# Patient Record
Sex: FEMALE | Race: WHITE | NOT HISPANIC OR LATINO | Employment: UNEMPLOYED | URBAN - METROPOLITAN AREA
[De-identification: names, ages, dates, MRNs, and addresses within clinical notes are randomized per-mention and may not be internally consistent; named-entity substitution may affect disease eponyms.]

---

## 2023-03-24 ENCOUNTER — HOSPITAL ENCOUNTER (EMERGENCY)
Facility: HOSPITAL | Age: 33
Discharge: HOME | End: 2023-03-25
Attending: INTERNAL MEDICINE

## 2023-03-24 VITALS
SYSTOLIC BLOOD PRESSURE: 139 MMHG | OXYGEN SATURATION: 98 % | WEIGHT: 150 LBS | BODY MASS INDEX: 23.54 KG/M2 | HEIGHT: 67 IN | HEART RATE: 87 BPM | RESPIRATION RATE: 18 BRPM | TEMPERATURE: 97.8 F | DIASTOLIC BLOOD PRESSURE: 87 MMHG

## 2023-03-24 DIAGNOSIS — K04.7 DENTAL ABSCESS: ICD-10-CM

## 2023-03-24 DIAGNOSIS — R68.84 JAW PAIN: Primary | ICD-10-CM

## 2023-03-24 RX ORDER — AMOXICILLIN 500 MG/1
500 CAPSULE ORAL ONCE
Status: COMPLETED | OUTPATIENT
Start: 2023-03-25 | End: 2023-03-25

## 2023-03-24 RX ORDER — IBUPROFEN 400 MG/1
800 TABLET ORAL ONCE
Status: DISCONTINUED | OUTPATIENT
Start: 2023-03-25 | End: 2023-03-25

## 2023-03-25 RX ORDER — KETOROLAC TROMETHAMINE 30 MG/ML
15 INJECTION, SOLUTION INTRAMUSCULAR; INTRAVENOUS ONCE
Status: COMPLETED | OUTPATIENT
Start: 2023-03-25 | End: 2023-03-25

## 2023-03-25 RX ORDER — AMOXICILLIN 500 MG/1
500 CAPSULE ORAL 3 TIMES DAILY
Qty: 30 CAPSULE | Refills: 0 | Status: SHIPPED | OUTPATIENT
Start: 2023-03-25 | End: 2023-04-04

## 2023-03-25 RX ORDER — KETOROLAC TROMETHAMINE 30 MG/ML
INJECTION, SOLUTION INTRAMUSCULAR; INTRAVENOUS
Status: COMPLETED
Start: 2023-03-25 | End: 2023-03-25

## 2023-03-25 RX ORDER — IBUPROFEN 600 MG/1
600 TABLET ORAL EVERY 6 HOURS PRN
Qty: 20 TABLET | Refills: 0 | Status: SHIPPED | OUTPATIENT
Start: 2023-03-24

## 2023-03-25 RX ADMIN — KETOROLAC TROMETHAMINE 15 MG: 30 INJECTION, SOLUTION INTRAMUSCULAR at 00:16

## 2023-03-25 RX ADMIN — KETOROLAC TROMETHAMINE 15 MG: 30 INJECTION, SOLUTION INTRAMUSCULAR; INTRAVENOUS at 00:16

## 2023-03-25 RX ADMIN — AMOXICILLIN 500 MG: 500 CAPSULE ORAL at 00:16

## 2023-03-25 NOTE — DISCHARGE INSTRUCTIONS
Rinse with Listerine or salt water  Follow-up with dentist as recommended  Take complete course of antibiotics

## 2023-03-25 NOTE — ED PROVIDER NOTES
History  Chief Complaint   Patient presents with   • Jaw Pain     Pt reports jaw pain, has abcess on left side she states     34year-old complaining of sided pillar pain  Has extremity poor dentition, as well as fever and follow-up and dental procedures  She had several teeth knocked out and now has significant decay  Over the last several days she has had increasing jaw pain, and dental pain  She is swelling over the jaw as well  She denies fever or chills  She does smoke  Suffers from hypertension and is treated but does not know her primary care is  Currently there is no trismus  None       History reviewed  No pertinent past medical history  History reviewed  No pertinent surgical history  History reviewed  No pertinent family history  I have reviewed and agree with the history as documented  E-Cigarette/Vaping     E-Cigarette/Vaping Substances     Social History     Tobacco Use   • Smoking status: Every Day     Packs/day: 0 50     Years: 10 00     Pack years: 5 00     Types: Cigarettes   • Smokeless tobacco: Never   Substance Use Topics   • Alcohol use: Never   • Drug use: Never       Review of Systems   Constitutional: Negative for chills and fever  HENT: Positive for dental problem and mouth sores  Negative for rhinorrhea and sore throat  Left mandibular pain   Eyes: Negative for visual disturbance  Respiratory: Negative for cough and shortness of breath  Cardiovascular: Negative for chest pain and leg swelling  Gastrointestinal: Negative for abdominal pain, diarrhea, nausea and vomiting  Genitourinary: Negative for dysuria  Musculoskeletal: Negative for back pain and myalgias  Skin: Negative for rash  Neurological: Negative for dizziness and headaches  Psychiatric/Behavioral: Negative for confusion  All other systems reviewed and are negative  Physical Exam  Physical Exam  Vitals and nursing note reviewed     Constitutional:       Appearance: She is well-developed  Comments: Appears uncomfortable   HENT:      Head: Normocephalic and atraumatic  Right Ear: Tympanic membrane normal       Left Ear: Tympanic membrane normal       Nose: Nose normal       Mouth/Throat:      Mouth: Mucous membranes are dry  Pharynx: No oropharyngeal exudate  Comments: Extensive gingival inflammation in the lower gum  Extensive tooth decay  Tenderness over the left mental part of the mandible  Minimal adenopathy  Eyes:      General: No scleral icterus  Conjunctiva/sclera: Conjunctivae normal       Pupils: Pupils are equal, round, and reactive to light  Neck:      Vascular: No JVD  Trachea: No tracheal deviation  Cardiovascular:      Rate and Rhythm: Normal rate and regular rhythm  Heart sounds: Normal heart sounds  No murmur heard  Pulmonary:      Effort: Pulmonary effort is normal  No respiratory distress  Breath sounds: Normal breath sounds  No wheezing or rales  Abdominal:      General: Bowel sounds are normal       Palpations: Abdomen is soft  Tenderness: There is no abdominal tenderness  There is no guarding  Musculoskeletal:         General: No tenderness  Normal range of motion  Cervical back: Normal range of motion and neck supple  Skin:     General: Skin is warm and dry  Neurological:      Mental Status: She is alert and oriented to person, place, and time  Cranial Nerves: No cranial nerve deficit  Sensory: No sensory deficit  Motor: No abnormal muscle tone        Comments: 5/5 motor, nl sens   Psychiatric:         Behavior: Behavior normal          Vital Signs  ED Triage Vitals [03/24/23 2235]   Temperature Pulse Respirations Blood Pressure SpO2   97 8 °F (36 6 °C) 87 18 139/87 98 %      Temp Source Heart Rate Source Patient Position - Orthostatic VS BP Location FiO2 (%)   Tympanic Monitor Lying Left arm --      Pain Score       10 - Worst Possible Pain           Vitals:    03/24/23 2235   BP: 139/87   Pulse: 87   Patient Position - Orthostatic VS: Lying         Visual Acuity      ED Medications  Medications   amoxicillin (AMOXIL) capsule 500 mg (500 mg Oral Given 3/25/23 0016)   ketorolac (TORADOL) injection 15 mg (15 mg Intramuscular Given 3/25/23 0016)       Diagnostic Studies  Results Reviewed     None                 No orders to display              Procedures  Procedures         ED Course                                             MDM    Disposition  Final diagnoses:   Jaw pain   Dental abscess     Time reflects when diagnosis was documented in both MDM as applicable and the Disposition within this note     Time User Action Codes Description Comment    3/24/2023 11:58 PM Umer Amel [J59 01] Jaw pain     3/24/2023 11:58 PM Budprakashbill Lynnpedritojosh Brittny [K04 [id number]] Dental abscess       ED Disposition     ED Disposition   Discharge    Condition   Stable    Date/Time   Fri Mar 24, 2023 11:58 PM    Comment   Ana Urbano discharge to home/self care  Follow-up Information     Follow up With Specialties Details Why [address]  Schedule an appointment as soon as possible for a visit   [address] [id number]  [address]  Schedule an appointment as soon as possible for a visit   [address]  [place] [id number] [phone]          Discharge Medication List as of 3/25/2023 12:01 AM      START taking these medications    Details   amoxicillin (AMOXIL) 500 mg capsule Take 1 capsule (500 mg total) by mouth 3 (three) times a day for 10 days, Starting Sat 3/25/2023, Until Tue 4/4/2023, Normal      ibuprofen (MOTRIN) 600 mg tablet Take 1 tablet (600 mg total) by mouth every 6 (six) hours as needed for mild pain or moderate pain for up to 20 doses, Starting Fri 3/24/2023, Normal             No discharge procedures on file      PDMP Review     None          ED Provider  Electronically Signed by           Taj Ruth, DO  03/25/23 7000 [facility], DO  03/25/23 7000 [facility], DO  03/25/23 1022

## 2023-05-13 ENCOUNTER — APPOINTMENT (OUTPATIENT)
Dept: RADIOLOGY | Facility: HOSPITAL | Age: 33
End: 2023-05-13

## 2023-05-13 ENCOUNTER — HOSPITAL ENCOUNTER (EMERGENCY)
Facility: HOSPITAL | Age: 33
Discharge: HOME | End: 2023-05-13
Attending: EMERGENCY MEDICINE | Admitting: EMERGENCY MEDICINE

## 2023-05-13 VITALS
WEIGHT: 144.4 LBS | DIASTOLIC BLOOD PRESSURE: 98 MMHG | SYSTOLIC BLOOD PRESSURE: 145 MMHG | RESPIRATION RATE: 16 BRPM | OXYGEN SATURATION: 100 % | TEMPERATURE: 97.8 F | HEART RATE: 119 BPM | BODY MASS INDEX: 22.62 KG/M2

## 2023-05-13 DIAGNOSIS — M79.602 ARM PAIN, LEFT: Primary | ICD-10-CM

## 2023-05-13 RX ORDER — IBUPROFEN 600 MG/1
600 TABLET ORAL ONCE
Status: COMPLETED | OUTPATIENT
Start: 2023-05-13 | End: 2023-05-13

## 2023-05-13 RX ORDER — ACETAMINOPHEN 325 MG/1
650 TABLET ORAL ONCE
Status: COMPLETED | OUTPATIENT
Start: 2023-05-13 | End: 2023-05-13

## 2023-05-13 RX ADMIN — IBUPROFEN 600 MG: 600 TABLET, FILM COATED ORAL at 23:06

## 2023-05-13 RX ADMIN — ACETAMINOPHEN 650 MG: 325 TABLET ORAL at 23:06

## 2023-05-14 NOTE — ED NOTES
Pt left ED prior to applying sling and follow up vital signs taken  Provider made aware         Tona Mckinney, RN  05/13/23 5216
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
